# Patient Record
Sex: MALE | Race: BLACK OR AFRICAN AMERICAN | HISPANIC OR LATINO | URBAN - METROPOLITAN AREA
[De-identification: names, ages, dates, MRNs, and addresses within clinical notes are randomized per-mention and may not be internally consistent; named-entity substitution may affect disease eponyms.]

---

## 2023-05-09 ENCOUNTER — TELEPHONE ENCOUNTER (OUTPATIENT)
Dept: URBAN - METROPOLITAN AREA CLINIC 7 | Facility: CLINIC | Age: 48
End: 2023-05-09

## 2023-06-07 PROBLEM — 305058001: Status: ACTIVE | Noted: 2023-06-07

## 2023-06-07 PROBLEM — 255056009: Status: ACTIVE | Noted: 2023-06-07

## 2023-06-09 ENCOUNTER — OFFICE VISIT (OUTPATIENT)
Dept: URBAN - METROPOLITAN AREA CLINIC 7 | Facility: CLINIC | Age: 48
End: 2023-06-09

## 2023-06-09 NOTE — HPI-TODAY'S VISIT:
Patient is new to the practice and is being evaluated for trouble swallowing and possible consideration for colonoscopy.  He does have a history of smoking.  He started noticing lumps in the right side of his neck in September 2022 and only until mid spring 2023 was further evaluation pursued.  He did have an FNA of the right neck masses which revealed squamous cell carcinoma.  He was then seen by ENT who has also referred the patient to radiation oncology and oncology.  On nasolaryngoscopy he was noted to have an extensive tumor that feels in the nasopharynx but appears to be centered over the right lateral wall and eustachian tube orifice, as well as compressing the posterior wall and extends inferiorly to the level of the tongue base in both areas.  The airway is patent and the larynx is clear.  He has had a PET scan that was ordered in late April.  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

## 2023-06-19 ENCOUNTER — TELEPHONE ENCOUNTER (OUTPATIENT)
Dept: URBAN - METROPOLITAN AREA CLINIC 7 | Facility: CLINIC | Age: 48
End: 2023-06-19

## 2023-06-20 ENCOUNTER — OFFICE VISIT (OUTPATIENT)
Dept: URBAN - METROPOLITAN AREA CLINIC 7 | Facility: CLINIC | Age: 48
End: 2023-06-20

## 2023-06-21 ENCOUNTER — LAB OUTSIDE AN ENCOUNTER (OUTPATIENT)
Dept: URBAN - METROPOLITAN AREA CLINIC 7 | Facility: CLINIC | Age: 48
End: 2023-06-21

## 2023-06-21 ENCOUNTER — OFFICE VISIT (OUTPATIENT)
Dept: URBAN - METROPOLITAN AREA CLINIC 7 | Facility: CLINIC | Age: 48
End: 2023-06-21
Payer: SELF-PAY

## 2023-06-21 ENCOUNTER — WEB ENCOUNTER (OUTPATIENT)
Dept: URBAN - METROPOLITAN AREA CLINIC 7 | Facility: CLINIC | Age: 48
End: 2023-06-21

## 2023-06-21 VITALS
WEIGHT: 231 LBS | DIASTOLIC BLOOD PRESSURE: 80 MMHG | RESPIRATION RATE: 16 BRPM | BODY MASS INDEX: 31.29 KG/M2 | TEMPERATURE: 97.8 F | HEIGHT: 72 IN | SYSTOLIC BLOOD PRESSURE: 120 MMHG

## 2023-06-21 DIAGNOSIS — R13.10 DYSPHAGIA, UNSPECIFIED TYPE: ICD-10-CM

## 2023-06-21 DIAGNOSIS — C76.0 HEAD AND NECK CANCER: ICD-10-CM

## 2023-06-21 DIAGNOSIS — Z12.11 SCREEN FOR COLON CANCER: ICD-10-CM

## 2023-06-21 DIAGNOSIS — Z92.29 HX OF LONG TERM USE OF BLOOD THINNERS: ICD-10-CM

## 2023-06-21 PROBLEM — 161647008: Status: ACTIVE | Noted: 2023-06-21

## 2023-06-21 PROCEDURE — 99204 OFFICE O/P NEW MOD 45 MIN: CPT | Performed by: INTERNAL MEDICINE

## 2023-06-21 RX ORDER — METOPROLOL SUCCINATE 50 MG/1
TABLET, EXTENDED RELEASE ORAL
Qty: 90 TABLET | Status: ACTIVE | COMMUNITY

## 2023-06-21 RX ORDER — LISINOPRIL 2.5 MG/1
TABLET ORAL
Qty: 90 TABLET | Status: ACTIVE | COMMUNITY

## 2023-06-21 RX ORDER — CLOPIDOGREL BISULFATE 75 MG/1
TABLET, FILM COATED ORAL
Qty: 90 TABLET | Status: ACTIVE | COMMUNITY

## 2023-06-21 RX ORDER — METFORMIN HCL 500 MG/1
TABLET ORAL
Qty: 60 TABLET | Status: ACTIVE | COMMUNITY

## 2023-06-21 NOTE — HPI-TODAY'S VISIT:
Patient is new to the practice and is being evaluated for trouble swallowing and possible consideration for colonoscopy.  He does have a history of smoking.  He started noticing lumps in the right side of his neck in September 2022 and only until mid spring 2023 was further evaluation pursued.  He did have an FNA of the right neck masses which revealed squamous cell carcinoma.  He was then seen by ENT who has also referred the patient to radiation oncology and oncology.  On nasolaryngoscopy he was noted to have an extensive tumor that feels in the nasopharynx but appears to be centered over the right lateral wall and eustachian tube orifice, as well as compressing the posterior wall and extends inferiorly to the level of the tongue base in both areas.  The airway is patent and the larynx is clear.  He has had a PET scan that was ordered in late April.  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 He has been getting radiation to head/neck, 5 times per week, for 6-7 weeks, having dysphgaia now due to radiation. Has lost about 30 lbs with his treatment. He is actually almost done with radiation. He is taking PO but just liquids, shakes due to throat irritation. No prior colonoscopy. He is on plavix, but unclear why.

## 2023-07-18 ENCOUNTER — OFFICE VISIT (OUTPATIENT)
Dept: URBAN - METROPOLITAN AREA CLINIC 7 | Facility: CLINIC | Age: 48
End: 2023-07-18

## 2023-07-20 ENCOUNTER — OFFICE VISIT (OUTPATIENT)
Dept: URBAN - METROPOLITAN AREA CLINIC 7 | Facility: CLINIC | Age: 48
End: 2023-07-20
Payer: SELF-PAY

## 2023-07-20 ENCOUNTER — TELEPHONE ENCOUNTER (OUTPATIENT)
Dept: URBAN - METROPOLITAN AREA CLINIC 7 | Facility: CLINIC | Age: 48
End: 2023-07-20

## 2023-07-20 VITALS
BODY MASS INDEX: 27.63 KG/M2 | HEART RATE: 100 BPM | WEIGHT: 204 LBS | HEIGHT: 72 IN | DIASTOLIC BLOOD PRESSURE: 60 MMHG | SYSTOLIC BLOOD PRESSURE: 88 MMHG | TEMPERATURE: 97.8 F

## 2023-07-20 DIAGNOSIS — Z12.11 SCREEN FOR COLON CANCER: ICD-10-CM

## 2023-07-20 DIAGNOSIS — C76.0 HEAD AND NECK CANCER: ICD-10-CM

## 2023-07-20 DIAGNOSIS — I48.20 CHRONIC ATRIAL FIBRILLATION: ICD-10-CM

## 2023-07-20 DIAGNOSIS — Z92.29 HX OF LONG TERM USE OF BLOOD THINNERS: ICD-10-CM

## 2023-07-20 DIAGNOSIS — R13.10 DYSPHAGIA, UNSPECIFIED TYPE: ICD-10-CM

## 2023-07-20 PROCEDURE — 99214 OFFICE O/P EST MOD 30 MIN: CPT | Performed by: INTERNAL MEDICINE

## 2023-07-20 RX ORDER — METFORMIN HCL 500 MG/1
1 TABLET WITH A MEAL TABLET ORAL TWICE A DAY
Qty: 60 TABLET | Status: ACTIVE | COMMUNITY

## 2023-07-20 RX ORDER — LISINOPRIL 2.5 MG/1
1 TABLET TABLET ORAL ONCE A DAY
Qty: 90 TABLET | Status: ACTIVE | COMMUNITY

## 2023-07-20 RX ORDER — METOPROLOL SUCCINATE 50 MG/1
1 TABLET TABLET, EXTENDED RELEASE ORAL ONCE A DAY
Qty: 90 TABLET | Status: ACTIVE | COMMUNITY

## 2023-07-20 RX ORDER — CLOPIDOGREL BISULFATE 75 MG/1
1 TABLET TABLET, FILM COATED ORAL ONCE A DAY
Qty: 90 TABLET | Status: ACTIVE | COMMUNITY

## 2023-07-20 NOTE — HPI-TODAY'S VISIT:
6/2023. , he was being evaluated for trouble swallowing and possible consideration for colonoscopy.  He does have a history of smoking.  He started noticing lumps in the right side of his neck in September 2022 and only until mid spring 2023 was further evaluation pursued.  He did have an FNA of the right neck masses which revealed squamous cell carcinoma.  He was then seen by ENT who has also referred the patient to radiation oncology and oncology.  On nasolaryngoscopy he was noted to have an extensive tumor in the nasopharynx but appears to be centered over the right lateral wall and eustachian tube orifice, as well as compressing the posterior wall and extends inferiorly to the level of the tongue base in both areas.  The airway is patent and the larynx is clear.  He has had a PET scan that was ordered in late April.  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 He has been getting radiation to head/neck, 5 times per week, for 6-7 weeks, was having dysphgaia due to radiation. Had lost about 30 lbs with his treatment. At , was almost done with radiation. He was taking PO but just liquids, shakes due to throat irritation. No prior colonoscopy. He is on plavix, but unclear why. , given he was almost done with radiation, he wished to observe for another few weeks before pursuing EGD for PEG. He did have a GI cocktail to use as needed. He was taking clopidogrel for what he tells me is an arrhythmia, but unclear details and so referred him to cardiology - it was unclear why he was on Plavix. No stents, no strokes, no CAD per his report. Weight 231 LV. FU now. Weight is now 204. He finished his XRT 4 weeks ago, chemo 1 week ago. He continues to have odynophagia, not taking much PO intake because of throat soreness. He has been feeling lightheaded at times. Was getting IV fluids via FCS. He did have an ENT eval with no residual tumor.

## 2023-07-25 ENCOUNTER — TELEPHONE ENCOUNTER (OUTPATIENT)
Dept: URBAN - METROPOLITAN AREA CLINIC 7 | Facility: CLINIC | Age: 48
End: 2023-07-25

## 2023-08-24 ENCOUNTER — OFFICE VISIT (OUTPATIENT)
Dept: URBAN - METROPOLITAN AREA CLINIC 7 | Facility: CLINIC | Age: 48
End: 2023-08-24
Payer: SELF-PAY

## 2023-08-24 VITALS
DIASTOLIC BLOOD PRESSURE: 60 MMHG | TEMPERATURE: 97.7 F | BODY MASS INDEX: 26.68 KG/M2 | HEIGHT: 72 IN | HEART RATE: 101 BPM | WEIGHT: 197 LBS | SYSTOLIC BLOOD PRESSURE: 88 MMHG

## 2023-08-24 DIAGNOSIS — Z12.11 SCREEN FOR COLON CANCER: ICD-10-CM

## 2023-08-24 DIAGNOSIS — Z92.29 HX OF LONG TERM USE OF BLOOD THINNERS: ICD-10-CM

## 2023-08-24 DIAGNOSIS — C76.0 HEAD AND NECK CANCER: ICD-10-CM

## 2023-08-24 DIAGNOSIS — R13.10 DYSPHAGIA, UNSPECIFIED TYPE: ICD-10-CM

## 2023-08-24 DIAGNOSIS — I48.20 CHRONIC ATRIAL FIBRILLATION: ICD-10-CM

## 2023-08-24 PROCEDURE — 99213 OFFICE O/P EST LOW 20 MIN: CPT | Performed by: INTERNAL MEDICINE

## 2023-08-24 RX ORDER — LISINOPRIL 2.5 MG/1
1 TABLET TABLET ORAL ONCE A DAY
Qty: 90 TABLET | Status: ACTIVE | COMMUNITY

## 2023-08-24 RX ORDER — METFORMIN HCL 500 MG/1
1 TABLET WITH A MEAL TABLET ORAL TWICE A DAY
Qty: 60 TABLET | Status: ACTIVE | COMMUNITY

## 2023-08-24 RX ORDER — CLOPIDOGREL BISULFATE 75 MG/1
1 TABLET TABLET, FILM COATED ORAL ONCE A DAY
Qty: 90 TABLET | Status: ACTIVE | COMMUNITY

## 2023-08-24 RX ORDER — METOPROLOL SUCCINATE 50 MG/1
1 TABLET TABLET, EXTENDED RELEASE ORAL ONCE A DAY
Qty: 90 TABLET | Status: ACTIVE | COMMUNITY

## 2023-08-24 NOTE — HPI-TODAY'S VISIT:
LV 7/2023. He has been evaluated for trouble swallowing and possible consideration for colonoscopy.  He does have a history of smoking.  He started noticing lumps in the right side of his neck in September 2022 and only until mid spring 2023 was further evaluation pursued.  He did have an FNA of the right neck masses which revealed squamous cell carcinoma.  He was then seen by ENT who has also referred the patient to radiation oncology and oncology.  On nasolaryngoscopy he was noted to have an extensive tumor in the nasopharynx but appears to be centered over the right lateral wall and eustachian tube orifice, as well as compressing the posterior wall and extends inferiorly to the level of the tongue base in both areas.  The airway was patent and the larynx was clear. 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 He had been getting radiation to head/neck, 5 times per week, for 6-7 weeks, was having dysphgaia due to radiation. Had lost about 30+ lbs with his treatment. At first visit with me, was almost done with radiation. He was taking PO but just liquids, shakes due to throat irritation. No prior colonoscopy. He was on plavix, but unclear why. Given he was almost done with radiation, he wished to observe for another few weeks before pursuing EGD for PEG. He did have a GI cocktail to use as needed. He was taking clopidogrel for what he tells me is an arrhythmia, but unclear details and so referred him to cardiology - it was unclear why he was on Plavix. No stents, no strokes, no CAD per his report. Weight 231 LV. At second visit, weight was now 204. He had finished his XRT 4 weeks prior, chemo 1 week prior. He continued to have odynophagia, not taking much PO intake because of throat soreness. He has been feeling lightheaded at times. Was getting IV fluids via FCS. He did have an ENT eval with no residual tumor seen. Discussed more intently the need for alternative means of nutrition given his weight loss, but he wishes to defer to try and continue taking as much PO as possible. I did express that his weight loss was pretty significant and concerning. Tried to get him in for IV fluids quickly, as well as into cardiology regarding plavix question (apparently on this for atrial fib).  He is now 197 lbs but tells me that he was actually down to 193, so was lower before. He is getting IV fluids every 2 weeks. He has not seen cardiology. Eating more solids, and feeling better. Has been using GI cocktail. Still on Plavix.

## 2023-10-17 ENCOUNTER — OFFICE VISIT (OUTPATIENT)
Dept: URBAN - METROPOLITAN AREA CLINIC 7 | Facility: CLINIC | Age: 48
End: 2023-10-17

## 2023-10-17 ENCOUNTER — TELEPHONE ENCOUNTER (OUTPATIENT)
Dept: URBAN - METROPOLITAN AREA CLINIC 7 | Facility: CLINIC | Age: 48
End: 2023-10-17

## 2023-10-17 RX ORDER — CLOPIDOGREL BISULFATE 75 MG/1
1 TABLET TABLET, FILM COATED ORAL ONCE A DAY
Qty: 90 TABLET | Status: ACTIVE | COMMUNITY

## 2023-10-17 RX ORDER — METFORMIN HCL 500 MG/1
1 TABLET WITH A MEAL TABLET ORAL TWICE A DAY
Qty: 60 TABLET | Status: ACTIVE | COMMUNITY

## 2023-10-17 RX ORDER — LISINOPRIL 2.5 MG/1
1 TABLET TABLET ORAL ONCE A DAY
Qty: 90 TABLET | Status: ACTIVE | COMMUNITY

## 2023-10-17 RX ORDER — METOPROLOL SUCCINATE 50 MG/1
1 TABLET TABLET, EXTENDED RELEASE ORAL ONCE A DAY
Qty: 90 TABLET | Status: ACTIVE | COMMUNITY

## 2023-10-17 NOTE — HPI-TODAY'S VISIT:
LV 8/2023. He has been evaluated for trouble swallowing and possible consideration for colonoscopy.  He does have a history of smoking.  He started noticing lumps in the right side of his neck in September 2022 and only until mid spring 2023 was further evaluation pursued.  He did have an FNA of the right neck masses which revealed squamous cell carcinoma.  He was then seen by ENT who has also referred the patient to radiation oncology and oncology.  On nasolaryngoscopy he was noted to have an extensive tumor in the nasopharynx but appears to be centered over the right lateral wall and eustachian tube orifice, as well as compressing the posterior wall and extends inferiorly to the level of the tongue base in both areas.  The airway was patent and the larynx was clear. 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 He had been getting radiation to head/neck, 5 times per week, for 6-7 weeks, was having dysphgaia due to radiation. Had lost about 30+ lbs with his treatment. At first visit with me, was almost done with radiation. He was taking PO but just liquids, shakes due to throat irritation. No prior colonoscopy. He was on plavix, but unclear why. Given he was almost done with radiation, he wished to observe for another few weeks before pursuing EGD for PEG. He did have a GI cocktail to use as needed. He was taking clopidogrel for what he tells me is an arrhythmia, but unclear details and so referred him to cardiology - it was unclear why he was on Plavix. No stents, no strokes, no CAD per his report. Weight 231. At second visit, weight was now 204. He had finished his XRT 4 weeks prior, chemo 1 week prior. He continued to have odynophagia, not taking much PO intake because of throat soreness. He has been feeling lightheaded at times. Was getting IV fluids via FCS. He did have an ENT eval with no residual tumor seen. Discussed more intently the need for alternative means of nutrition given his weight loss, but he wished to defer to try and continue taking as much PO as possible. I did express that his weight loss was pretty significant and concerning. Tried to get him in for IV fluids quickly, as well as into cardiology regarding plavix question (apparently on this for atrial fib). He then dropped to 197 lbs but tells me that he was actually down to 193, so was actually coming up. He was getting IV fluids every 2 weeks. He had not seen cardiology. Was eating more solids, and feeling better. Has been using GI cocktail. Still on Plavix. Last visit, his weight was coming up and he was eating much more and his throat was feeling better.  I favored last visit that we could hold off on PEG placement and just observe him.  I did suggest that he follow-up with me in 2 months for weight monitoring and still wanted to get him into see cardiology to address his Plavix issue as he will need a screening colonoscopy in the near term future. FU now.

## 2024-01-11 ENCOUNTER — TELEPHONE ENCOUNTER (OUTPATIENT)
Dept: URBAN - METROPOLITAN AREA CLINIC 7 | Facility: CLINIC | Age: 49
End: 2024-01-11

## 2024-01-11 VITALS — WEIGHT: 187 LBS | HEIGHT: 72 IN | BODY MASS INDEX: 25.33 KG/M2

## 2024-02-01 ENCOUNTER — OV EP (OUTPATIENT)
Dept: URBAN - METROPOLITAN AREA CLINIC 7 | Facility: CLINIC | Age: 49
End: 2024-02-01

## 2024-02-01 RX ORDER — METOPROLOL SUCCINATE 50 MG/1
1 TABLET TABLET, EXTENDED RELEASE ORAL ONCE A DAY
Qty: 90 TABLET | Status: ACTIVE | COMMUNITY

## 2024-02-01 RX ORDER — LISINOPRIL 2.5 MG/1
1 TABLET TABLET ORAL ONCE A DAY
Qty: 90 TABLET | Status: ACTIVE | COMMUNITY

## 2024-02-01 RX ORDER — METFORMIN HCL 500 MG/1
1 TABLET WITH A MEAL TABLET ORAL TWICE A DAY
Qty: 60 TABLET | Status: ACTIVE | COMMUNITY

## 2024-02-01 RX ORDER — CLOPIDOGREL BISULFATE 75 MG/1
1 TABLET TABLET, FILM COATED ORAL ONCE A DAY
Qty: 90 TABLET | Status: ACTIVE | COMMUNITY

## 2024-02-01 NOTE — PHYSICAL EXAM HENT:
Head, normocephalic, atraumatic, Face, Face within normal limits, Ears, External ears within normal limits, Nose/Nasopharynx, External nose normal appearance, nares patent, no nasal discharge, Mouth and Throat, Oral cavity appearance normal, Lips, Appearance normal Ivermectin Counseling:  Patient instructed to take medication on an empty stomach with a full glass of water.  Patient informed of potential adverse effects including but not limited to nausea, diarrhea, dizziness, itching, and swelling of the extremities or lymph nodes.  The patient verbalized understanding of the proper use and possible adverse effects of ivermectin.  All of the patient's questions and concerns were addressed.

## 2024-03-20 ENCOUNTER — LAB (OUTPATIENT)
Dept: URBAN - METROPOLITAN AREA CLINIC 7 | Facility: CLINIC | Age: 49
End: 2024-03-20

## 2024-03-26 ENCOUNTER — OV EP (OUTPATIENT)
Dept: URBAN - METROPOLITAN AREA CLINIC 7 | Facility: CLINIC | Age: 49
End: 2024-03-26

## 2024-03-26 RX ORDER — METOPROLOL SUCCINATE 50 MG/1
1 TABLET TABLET, EXTENDED RELEASE ORAL ONCE A DAY
Qty: 90 TABLET | Status: ACTIVE | COMMUNITY

## 2024-03-26 RX ORDER — LISINOPRIL 2.5 MG/1
1 TABLET TABLET ORAL ONCE A DAY
Qty: 90 TABLET | Status: ACTIVE | COMMUNITY

## 2024-03-26 RX ORDER — CLOPIDOGREL BISULFATE 75 MG/1
1 TABLET TABLET, FILM COATED ORAL ONCE A DAY
Qty: 90 TABLET | Status: ACTIVE | COMMUNITY

## 2024-03-26 RX ORDER — METFORMIN HCL 500 MG/1
1 TABLET WITH A MEAL TABLET ORAL TWICE A DAY
Qty: 60 TABLET | Status: ACTIVE | COMMUNITY

## 2024-06-04 ENCOUNTER — OFFICE VISIT (OUTPATIENT)
Dept: URBAN - METROPOLITAN AREA CLINIC 7 | Facility: CLINIC | Age: 49
End: 2024-06-04